# Patient Record
Sex: FEMALE | Race: ASIAN | NOT HISPANIC OR LATINO
[De-identification: names, ages, dates, MRNs, and addresses within clinical notes are randomized per-mention and may not be internally consistent; named-entity substitution may affect disease eponyms.]

---

## 2024-05-30 PROBLEM — Z00.00 ENCOUNTER FOR PREVENTIVE HEALTH EXAMINATION: Status: ACTIVE | Noted: 2024-05-30

## 2024-06-13 ENCOUNTER — APPOINTMENT (OUTPATIENT)
Dept: THORACIC SURGERY | Facility: CLINIC | Age: 44
End: 2024-06-13
Payer: MEDICAID

## 2024-06-13 ENCOUNTER — NON-APPOINTMENT (OUTPATIENT)
Age: 44
End: 2024-06-13

## 2024-06-13 VITALS
BODY MASS INDEX: 25.44 KG/M2 | HEIGHT: 62.2 IN | OXYGEN SATURATION: 98 % | SYSTOLIC BLOOD PRESSURE: 134 MMHG | WEIGHT: 140 LBS | HEART RATE: 75 BPM | DIASTOLIC BLOOD PRESSURE: 84 MMHG | RESPIRATION RATE: 17 BRPM

## 2024-06-13 DIAGNOSIS — Z80.3 FAMILY HISTORY OF MALIGNANT NEOPLASM OF BREAST: ICD-10-CM

## 2024-06-13 DIAGNOSIS — R93.89 ABNORMAL FINDINGS ON DIAGNOSTIC IMAGING OF OTHER SPECIFIED BODY STRUCTURES: ICD-10-CM

## 2024-06-13 DIAGNOSIS — Z86.59 PERSONAL HISTORY OF OTHER MENTAL AND BEHAVIORAL DISORDERS: ICD-10-CM

## 2024-06-13 PROCEDURE — 99245 OFF/OP CONSLTJ NEW/EST HI 55: CPT

## 2024-06-13 RX ORDER — METOPROLOL TARTRATE 25 MG/1
25 TABLET, FILM COATED ORAL
Refills: 0 | Status: ACTIVE | COMMUNITY

## 2024-06-13 RX ORDER — ESCITALOPRAM OXALATE 20 MG/1
TABLET ORAL
Refills: 0 | Status: ACTIVE | COMMUNITY

## 2024-06-13 NOTE — HISTORY OF PRESENT ILLNESS
[FreeTextEntry1] : Ms. SHERITA DIAZ, 44 year old female, never smoker, w/ hx of anxiety, abnormal findings of chest wall, had CT chest and MRI chest done for few years , referred by Dr. Maryjane Cortez for evaluation of abnormal findings on MRI chest.   Pt has evaluated by Dr. Tal Diaz in 2019, had CT chest done on 7/19/19 at [facility]: unremarkable CT chest.   MRI Chest w/w/o on 5/6/24 at [facility]: - Along the posterolateral aspect of the left upper to mid chest partially within the serratus musculature there is a multifocal area of mixed fluid sensitive signal sequence hyperintensity and isointensity to muscle foci with fluid fluid level suggested. On T1 imaging the areas are slightly more hyperintense than adjacent muscle. This overall measures up to 8.8 cm craniocaudal by 6.1 cm AP by 2.7 cm transverse, with no evidence of postcontrast enhancement of this region. - An additional 1.3 cm fluid sensitive signal sequence hyperintense nonenhancing focus is seen along the posterolateral upper chest wall (series 7, image 8).  Patient is here today for CT Sx consultation. The patient denies SOB, cough, chest pain, hemoptysis, palpitation, fever, recent illness, hospitalization and significant weight loss.

## 2024-06-13 NOTE — PHYSICAL EXAM
[General Appearance - Alert] : alert [General Appearance - In No Acute Distress] : in no acute distress [General Appearance - Well Nourished] : well nourished [General Appearance - Well Developed] : well developed [Sclera] : the sclera and conjunctiva were normal [PERRL With Normal Accommodation] : pupils were equal in size, round, and reactive to light [Outer Ear] : the ears and nose were normal in appearance [Neck Appearance] : the appearance of the neck was normal [Neck Cervical Mass (___cm)] : no neck mass was observed [Respiration, Rhythm And Depth] : normal respiratory rhythm and effort [Exaggerated Use Of Accessory Muscles For Inspiration] : no accessory muscle use [Apical Impulse] : the apical impulse was normal [Auscultation Breath Sounds / Voice Sounds] : lungs were clear to auscultation bilaterally [Heart Rate And Rhythm] : heart rate was normal and rhythm regular [Heart Sounds] : normal S1 and S2 [Examination Of The Chest] : the chest was normal in appearance [No Pulse Delay] : no pulse delay [Bowel Sounds] : normal bowel sounds [Abdomen Soft] : soft [Abdomen Tenderness] : non-tender [No CVA Tenderness] : no ~M costovertebral angle tenderness [Involuntary Movements] : no involuntary movements were seen [Skin Color & Pigmentation] : normal skin color and pigmentation [Skin Turgor] : normal skin turgor [] : no rash [No Focal Deficits] : no focal deficits [Oriented To Time, Place, And Person] : oriented to person, place, and time [Affect] : the affect was normal [Mood] : the mood was normal

## 2024-06-13 NOTE — ASSESSMENT
[FreeTextEntry1] : Ms. SHERITA DIAZ, 44 year old female, never smoker, w/ hx of anxiety, abnormal findings of chest wall, had CT chest and MRI chest done for few years , referred by Dr. Maryjane Cortez for evaluation of abnormal findings on MRI chest.   Pt has evaluated by Dr. Tal Diaz in 2019, had CT chest done on 7/19/19 at [facility]: unremarkable CT chest.   MRI Chest w/w/o on 5/6/24 at [facility]: - Along the posterolateral aspect of the left upper to mid chest partially within the serratus musculature there is a multifocal area of mixed fluid sensitive signal sequence hyperintensity and isointensity to muscle foci with fluid fluid level suggested. On T1 imaging the areas are slightly more hyperintense than adjacent muscle. This overall measures up to 8.8 cm craniocaudal by 6.1 cm AP by 2.7 cm transverse, with no evidence of postcontrast enhancement of this region. - An additional 1.3 cm fluid sensitive signal sequence hyperintense nonenhancing focus is seen along the posterolateral upper chest wall (series 7, image 8).   I have reviewed the patient's medical records and diagnostic images at time of this office consultation and have made the following recommendation: 1. MRI chest report reviewed with pt today, pt is asymptomatic, no further intervention needed at this time. I recommended pt to repeat MRI chest w w /o contrast in 6 months.  2. RTC in 6 months with MRI chest.     I, Dr. DIAZ, MARIUSZ SCHAFFER, personally performed the evaluation and management (E/M) services for this established patient who follow up today with an existing condition.  That E/M includes conducting the examination, assessing all new/exacerbated/existing conditions, and establishing a plan of care. Today, my ACP, Subha Cortez NP, was here to observe my evaluation and management services for this existing condition to be followed going forward.

## 2024-06-13 NOTE — CONSULT LETTER
[FreeTextEntry3] : Luciano Diaz MD, MPH  System Director of Thoracic Surgery  Director of Comprehensive Lung and Foregut [place]  Professor Cardiovascular & Thoracic Surgery   [facility] [employer] [facility] [phone] [place] [address] [place] [address] Tel: [phone] Fax: [phone]

## 2024-06-13 NOTE — DATA REVIEWED
[FreeTextEntry1] : I have independently reviewed the following: MRI Chest w/w/o on 5/6/24 at [facility]:
[Follow-Up Visit] : a follow-up pain management visit

## 2024-06-14 ENCOUNTER — TRANSCRIPTION ENCOUNTER (OUTPATIENT)
Age: 44
End: 2024-06-14

## 2024-12-12 ENCOUNTER — APPOINTMENT (OUTPATIENT)
Dept: THORACIC SURGERY | Facility: CLINIC | Age: 44
End: 2024-12-12
Payer: MEDICAID

## 2024-12-12 VITALS
HEIGHT: 60 IN | OXYGEN SATURATION: 98 % | DIASTOLIC BLOOD PRESSURE: 88 MMHG | BODY MASS INDEX: 26.9 KG/M2 | SYSTOLIC BLOOD PRESSURE: 146 MMHG | WEIGHT: 137 LBS | RESPIRATION RATE: 16 BRPM | HEART RATE: 93 BPM

## 2024-12-12 DIAGNOSIS — R93.89 ABNORMAL FINDINGS ON DIAGNOSTIC IMAGING OF OTHER SPECIFIED BODY STRUCTURES: ICD-10-CM

## 2024-12-12 PROCEDURE — 99213 OFFICE O/P EST LOW 20 MIN: CPT
